# Patient Record
Sex: FEMALE | Race: WHITE | HISPANIC OR LATINO | Employment: UNEMPLOYED | URBAN - METROPOLITAN AREA
[De-identification: names, ages, dates, MRNs, and addresses within clinical notes are randomized per-mention and may not be internally consistent; named-entity substitution may affect disease eponyms.]

---

## 2022-06-29 ENCOUNTER — OFFICE VISIT (OUTPATIENT)
Dept: FAMILY MEDICINE CLINIC | Facility: CLINIC | Age: 22
End: 2022-06-29

## 2022-06-29 VITALS
SYSTOLIC BLOOD PRESSURE: 132 MMHG | HEIGHT: 66 IN | TEMPERATURE: 97.8 F | OXYGEN SATURATION: 98 % | HEART RATE: 78 BPM | BODY MASS INDEX: 42.59 KG/M2 | DIASTOLIC BLOOD PRESSURE: 76 MMHG | WEIGHT: 265 LBS

## 2022-06-29 DIAGNOSIS — Z12.4 ENCOUNTER FOR PAPANICOLAOU SMEAR OF CERVIX: ICD-10-CM

## 2022-06-29 DIAGNOSIS — Z00.00 WELL FEMALE EXAM WITHOUT GYNECOLOGICAL EXAM: Primary | ICD-10-CM

## 2022-06-29 PROCEDURE — 2014F MENTAL STATUS ASSESS: CPT | Performed by: NURSE PRACTITIONER

## 2022-06-29 PROCEDURE — 3008F BODY MASS INDEX DOCD: CPT | Performed by: NURSE PRACTITIONER

## 2022-06-29 PROCEDURE — 99385 PREV VISIT NEW AGE 18-39: CPT | Performed by: NURSE PRACTITIONER

## 2022-06-29 RX ORDER — CLINDAMYCIN HYDROCHLORIDE 300 MG/1
CAPSULE ORAL
COMMUNITY
Start: 2022-06-27 | End: 2022-09-15

## 2022-06-29 RX ORDER — IBUPROFEN 800 MG/1
TABLET ORAL
COMMUNITY
Start: 2022-06-27 | End: 2022-09-15

## 2022-06-29 NOTE — PROGRESS NOTES
Subjective   Celina Zayas is a 22 y.o. female.     Chief Complaint   Patient presents with   • Annual Exam     Declines papsmear     This is my first time seeing this patient. History obtained from patient and .   History of Present Illness   The patient is being seen for a health maintenance evaluation.  The last health maintenance visit is unknown.  Social history: Household members include spouse and in-laws.  She is .  Work status: Not currently employed.  The patient has never smoked cigarettes.  She reports no alcohol use.  General health: The patient's health is described as good.  She has regular dental visits.  The patient brushes 2 times a day, flosses occasionally and reports her last dental visit was less than 6 months ago.  She denies vision problems.  Vision care includes no need for vision correction and no recent eye exam.  She denies hearing loss.  Immunization status: Up-to-date.  Lifestyle: She does not exercise regularly.  Reproductive health: She is sexually active.  Screening: Pap smear is due.    The following portions of the patient's history were reviewed and updated as appropriate: allergies, current medications, past family history, past medical history, past social history, past surgical history and problem list.    History reviewed. No pertinent past medical history.    History reviewed. No pertinent surgical history.    History reviewed. No pertinent family history.    Social History     Socioeconomic History   • Marital status:    Tobacco Use   • Smoking status: Never Smoker   • Smokeless tobacco: Never Used   Substance and Sexual Activity   • Alcohol use: Never   • Drug use: Never       Review of Systems   Constitutional: Negative for fever.   HENT: Negative for ear pain, rhinorrhea and sore throat.    Eyes: Negative for visual disturbance.   Respiratory: Negative for cough and shortness of breath.    Cardiovascular: Negative for chest pain.  "  Gastrointestinal: Negative for abdominal pain, diarrhea, nausea and vomiting.   Genitourinary: Negative.    Musculoskeletal: Negative.    Skin: Negative for rash.   Neurological: Negative for dizziness and headache.   Psychiatric/Behavioral: Negative for depressed mood.       Objective   Vitals:    06/29/22 1139   BP: 132/76   BP Location: Right arm   Patient Position: Sitting   Cuff Size: Large Adult   Pulse: 78   Temp: 97.8 °F (36.6 °C)   TempSrc: Temporal   SpO2: 98%   Weight: 120 kg (265 lb)   Height: 167 cm (65.75\")      Body mass index is 43.1 kg/m².  Physical Exam  Vitals and nursing note reviewed.   Constitutional:       Appearance: Normal appearance.   HENT:      Head: Normocephalic and atraumatic.      Right Ear: Tympanic membrane and ear canal normal.      Left Ear: Tympanic membrane and ear canal normal.   Eyes:      Conjunctiva/sclera: Conjunctivae normal.      Pupils: Pupils are equal, round, and reactive to light.   Cardiovascular:      Rate and Rhythm: Normal rate and regular rhythm.      Heart sounds: Normal heart sounds.   Pulmonary:      Effort: Pulmonary effort is normal.      Breath sounds: Normal breath sounds.   Abdominal:      General: Bowel sounds are normal.      Palpations: Abdomen is soft.      Tenderness: There is no abdominal tenderness.   Genitourinary:     Comments: Declined   Musculoskeletal:         General: Normal range of motion.      Cervical back: Neck supple.   Skin:     General: Skin is warm and dry.   Neurological:      Mental Status: She is alert and oriented to person, place, and time.   Psychiatric:         Mood and Affect: Mood normal.           Assessment & Plan   Diagnoses and all orders for this visit:    1. Well female exam without gynecological exam (Primary)    2. Encounter for Papanicolaou smear of cervix  -     Ambulatory Referral to Gynecology    Impression: Currently, she has an inadequate exercise regimen.  Will refer to gynecology for Pap smear.  No " screening lab work is due at this time.  The immunizations are up-to-date.  Advice and education were given regarding aerobic exercise.

## 2022-09-15 ENCOUNTER — OFFICE VISIT (OUTPATIENT)
Dept: OBSTETRICS AND GYNECOLOGY | Age: 22
End: 2022-09-15

## 2022-09-15 VITALS
WEIGHT: 275 LBS | SYSTOLIC BLOOD PRESSURE: 135 MMHG | BODY MASS INDEX: 45.82 KG/M2 | HEIGHT: 65 IN | DIASTOLIC BLOOD PRESSURE: 90 MMHG

## 2022-09-15 DIAGNOSIS — Z12.4 ENCOUNTER FOR PAPANICOLAOU SMEAR FOR CERVICAL CANCER SCREENING: ICD-10-CM

## 2022-09-15 DIAGNOSIS — N89.8 VAGINAL DISCHARGE: ICD-10-CM

## 2022-09-15 DIAGNOSIS — R63.5 WEIGHT GAIN: ICD-10-CM

## 2022-09-15 DIAGNOSIS — Z30.09 CONTRACEPTIVE EDUCATION: ICD-10-CM

## 2022-09-15 DIAGNOSIS — N92.6 IRREGULAR MENSES: ICD-10-CM

## 2022-09-15 DIAGNOSIS — Z11.3 SCREEN FOR STD (SEXUALLY TRANSMITTED DISEASE): ICD-10-CM

## 2022-09-15 DIAGNOSIS — Z01.419 WELL WOMAN EXAM WITH ROUTINE GYNECOLOGICAL EXAM: Primary | ICD-10-CM

## 2022-09-15 DIAGNOSIS — Z23 NEED FOR HPV VACCINE: ICD-10-CM

## 2022-09-15 DIAGNOSIS — Z11.8 SCREENING FOR CHLAMYDIAL DISEASE: ICD-10-CM

## 2022-09-15 PROCEDURE — 99385 PREV VISIT NEW AGE 18-39: CPT | Performed by: PHYSICIAN ASSISTANT

## 2022-09-15 PROCEDURE — 90471 IMMUNIZATION ADMIN: CPT | Performed by: PHYSICIAN ASSISTANT

## 2022-09-15 PROCEDURE — 3008F BODY MASS INDEX DOCD: CPT | Performed by: PHYSICIAN ASSISTANT

## 2022-09-15 PROCEDURE — 90651 9VHPV VACCINE 2/3 DOSE IM: CPT | Performed by: PHYSICIAN ASSISTANT

## 2022-09-15 RX ORDER — FLUCONAZOLE 150 MG/1
150 TABLET ORAL ONCE
Qty: 2 TABLET | Refills: 0 | Status: SHIPPED | OUTPATIENT
Start: 2022-09-15 | End: 2022-09-15

## 2022-09-15 RX ORDER — NORETHINDRONE ACETATE AND ETHINYL ESTRADIOL 1MG-20(21)
1 KIT ORAL DAILY
Qty: 28 TABLET | Refills: 12 | Status: SHIPPED | OUTPATIENT
Start: 2022-09-15 | End: 2023-09-15

## 2022-09-15 NOTE — PROGRESS NOTES
Subjective     Chief Complaint   Patient presents with   • Gynecologic Exam     Cc; new pt today here for annual visit and pap - never had pap smear before - periods are very irregular past 2 months would like to discuss bcp or some sort of contraception - ISI garg here for the appointment - couple don't use condoms - moved from [place] 3 months ago -       History of Present Illness    Celina Zayas is a 22 y.o.  who presents for annual exam.    She is here with her BF, Claudio    Is noting vaginal discharge and itching  Onset prior to and after period   Has used monistat but caused caused more itching  Used it for 7 days  Onset 3 months ago    BF also noted a sore on top of his penis  No h/o this  He has been tested and it was negative    Never had a pap  Also needs a birth control    Says period are actually pretty regular, 28-32 days usually lasts for 7 days   Onset of menses at 11 yoa    From [place]  She is [place] speaking, BF is interpreting    Has not received gardasil in past, is open to receiving it    Her menses are irregular, lasting 4-7 days, dysmenorrhea none   Obstetric History:  OB History        1    Para        Term                AB   1    Living           SAB   1    IAB        Ectopic        Molar        Multiple        Live Births                   Menstrual History:     Patient's last menstrual period was 2022 (exact date).         Current contraception: none  History of abnormal Pap smear: never had before  Received Gardasil immunization: no  Perform regular self breast exam: no  Family history of uterine or ovarian cancer: no  Family History of colon cancer: no  Family history of breast cancer: no    Mammogram: not indicated.  Colonoscopy: not indicated.  DEXA: not indicated.    Exercise: not active  Calcium/Vitamin D: adequate intake    The following portions of the patient's history were reviewed and updated as appropriate: allergies, current  "medications, past family history, past medical history, past social history, past surgical history and problem list.    Review of Systems   Constitutional: Positive for unexpected weight change.   Genitourinary: Positive for menstrual problem (irregular periods).   All other systems reviewed and are negative.      Review of Systems   Constitutional: Negative for fatigue.   Respiratory: Negative for shortness of breath.    Gastrointestinal: Negative for abdominal pain.   Genitourinary: Negative for dysuria.   Neurological: Negative for headaches.   Psychiatric/Behavioral: Negative for dysphoric mood.         Objective   Physical Exam    /90   Ht 165.1 cm (65\")   Wt 125 kg (275 lb)   LMP 09/06/2022 (Exact Date)   Breastfeeding No   BMI 45.76 kg/m²   General:   alert, comfortable and no distress   Heart: regular rate and rhythm   Lungs: clear to auscultation bilaterally   Breast: normal appearance, no masses or tenderness, Inspection negative, No nipple retraction or dimpling, No nipple discharge or bleeding, No axillary or supraclavicular adenopathy, Normal to palpation without dominant masses, Taught monthly breast self examination   Neck: no adenopathy and no carotid bruit   Abdomen: normal findings: soft, non-tender   CVA: Not performed today   Pelvis: External genitalia: normal general appearance  Vaginal: normal mucosa without prolapse or lesions and discharge, white and thick  Cervix: normal appearance, thin prep PAP obtained and GC prep obtained  Adnexa: normal bimanual exam  Uterus: normal single, nontender  Exam limited by body habitus   Extremities: Not performed today   Neurologic: negative   Psychiatric: Normal affect, judgement, and mood     Assessment & Plan   Diagnoses and all orders for this visit:    1. Well woman exam with routine gynecological exam (Primary)  -     HPV 9-Valent Recomb Vaccine suspension 0.5 mL    2. Irregular menses  -     HPV 9-Valent Recomb Vaccine suspension 0.5 " mL    3. Screening for chlamydial disease  -     HPV 9-Valent Recomb Vaccine suspension 0.5 mL    4. Weight gain  -     TSH  -     Hemoglobin A1c  -     HPV 9-Valent Recomb Vaccine suspension 0.5 mL    5. Need for HPV vaccine  -     HPV 9-Valent Recomb Vaccine suspension 0.5 mL    6. Contraceptive education    7. Screen for STD (sexually transmitted disease)  -     IGP, CtNg, Rfx Aptima HPV ASCU    8. Encounter for Papanicolaou smear for cervical cancer screening  -     IGP, CtNg, Rfx Aptima HPV ASCU    9. Vaginal discharge  -     NuSwab BV & Candida - , Cervix    Other orders  -     fluconazole (Diflucan) 150 MG tablet; Take 1 tablet by mouth 1 (One) Time for 1 dose.  Dispense: 2 tablet; Refill: 0  -     norethindrone-ethinyl estradiol FE (Microgestin FE 1/20) 1-20 MG-MCG per tablet; Take 1 tablet by mouth Daily.  Dispense: 28 tablet; Refill: 12        All questions answered.  Breast self exam technique reviewed and patient encouraged to perform self-exam monthly.  Discussed healthy lifestyle modifications.  Recommended 30 minutes of aerobic exercise five times per week.  Discussed calcium needs to prevent osteoporosis.    Pap and std testing done  Suspect yeast, will start meds  Start bcp, start first day of next cycle, gave sample of phexxi to use in meantime  gardasil given today, rtc in 2 months for second dose  Check labs to assess weight gain, enc diet changes in meantime    Birth control risks were discussed with patient including risks of blood clots, strokes and DVT's. Also discussed proper use, start pill on first day of menses.  Use for one full month prior to having unprotected intercourse. BCP do not prevent against STD's, therefore condoms should be used at all times. Pt can expect irregular bleeding the first three months of use.

## 2022-09-16 LAB
HBA1C MFR BLD: 5.2 % (ref 4.8–5.6)
TSH SERPL DL<=0.005 MIU/L-ACNC: 1.54 UIU/ML (ref 0.27–4.2)

## 2022-09-18 LAB
A VAGINAE DNA VAG QL NAA+PROBE: ABNORMAL SCORE
BVAB2 DNA VAG QL NAA+PROBE: ABNORMAL SCORE
C ALBICANS DNA VAG QL NAA+PROBE: POSITIVE
C GLABRATA DNA VAG QL NAA+PROBE: NEGATIVE
MEGA1 DNA VAG QL NAA+PROBE: ABNORMAL SCORE

## 2022-09-19 ENCOUNTER — TELEPHONE (OUTPATIENT)
Dept: OBSTETRICS AND GYNECOLOGY | Age: 22
End: 2022-09-19

## 2022-09-19 NOTE — TELEPHONE ENCOUNTER
Voice mailbox not set up.  Unable to leave message for results:  swab is showing + for yeast, as suspected. I did give her medication at her appt, hope her sx's are improving.

## 2022-09-21 LAB
C TRACH RRNA CVX QL NAA+PROBE: NEGATIVE
CONV .: NORMAL
CYTOLOGIST CVX/VAG CYTO: NORMAL
CYTOLOGY CVX/VAG DOC CYTO: NORMAL
CYTOLOGY CVX/VAG DOC THIN PREP: NORMAL
DX ICD CODE: NORMAL
HIV 1 & 2 AB SER-IMP: NORMAL
N GONORRHOEA RRNA CVX QL NAA+PROBE: NEGATIVE
OTHER STN SPEC: NORMAL
STAT OF ADQ CVX/VAG CYTO-IMP: NORMAL

## 2022-09-29 ENCOUNTER — TELEPHONE (OUTPATIENT)
Dept: OBSTETRICS AND GYNECOLOGY | Age: 22
End: 2022-09-29

## 2022-09-29 RX ORDER — FLUCONAZOLE 150 MG/1
150 TABLET ORAL ONCE
Qty: 2 TABLET | Refills: 0 | Status: SHIPPED | OUTPATIENT
Start: 2022-09-29 | End: 2022-09-29

## 2022-09-29 RX ORDER — CLOTRIMAZOLE AND BETAMETHASONE DIPROPIONATE 10; .64 MG/G; MG/G
CREAM TOPICAL EVERY 12 HOURS SCHEDULED
Qty: 15 G | Refills: 0 | Status: SHIPPED | OUTPATIENT
Start: 2022-09-29 | End: 2023-02-07 | Stop reason: SDUPTHER

## 2022-09-29 NOTE — TELEPHONE ENCOUNTER
Pt's  calling stating that Pt finished medication for vaginal itching but the itching is not better. Pt is requesting more medication

## 2023-02-07 ENCOUNTER — OFFICE VISIT (OUTPATIENT)
Dept: OBSTETRICS AND GYNECOLOGY | Age: 23
End: 2023-02-07
Payer: COMMERCIAL

## 2023-02-07 VITALS
WEIGHT: 286 LBS | DIASTOLIC BLOOD PRESSURE: 72 MMHG | HEIGHT: 65 IN | SYSTOLIC BLOOD PRESSURE: 118 MMHG | BODY MASS INDEX: 47.65 KG/M2

## 2023-02-07 DIAGNOSIS — N92.6 IRREGULAR MENSES: ICD-10-CM

## 2023-02-07 DIAGNOSIS — N89.8 VAGINAL ITCHING: Primary | ICD-10-CM

## 2023-02-07 LAB
B-HCG UR QL: NEGATIVE
EXPIRATION DATE: NORMAL
INTERNAL NEGATIVE CONTROL: NORMAL
INTERNAL POSITIVE CONTROL: NORMAL
Lab: NORMAL

## 2023-02-07 PROCEDURE — 81025 URINE PREGNANCY TEST: CPT | Performed by: PHYSICIAN ASSISTANT

## 2023-02-07 PROCEDURE — 99213 OFFICE O/P EST LOW 20 MIN: CPT | Performed by: PHYSICIAN ASSISTANT

## 2023-02-07 RX ORDER — CLOTRIMAZOLE AND BETAMETHASONE DIPROPIONATE 10; .64 MG/G; MG/G
CREAM TOPICAL EVERY 12 HOURS SCHEDULED
Qty: 15 G | Refills: 2 | Status: SHIPPED | OUTPATIENT
Start: 2023-02-07

## 2023-02-07 RX ORDER — FLUCONAZOLE 150 MG/1
150 TABLET ORAL ONCE
Qty: 1 TABLET | Refills: 3 | Status: SHIPPED | OUTPATIENT
Start: 2023-02-07 | End: 2023-02-07

## 2023-02-07 NOTE — PROGRESS NOTES
"Subjective     Chief Complaint   Patient presents with   • Gynecologic Exam     c/o was seen before for vaginal itching, was given cream and pills for this and it has helped but she still is having the itching after her cycle.        Celina Zayas is a 22 y.o.  whose LMP is No LMP recorded. presents with vaginal itching    Here with c/o vaginal itching  Is recurrent, usually after menses  Does go away after a few days    Here with Claudio GARCIA  He interprets    Denies risk of sti    Did have 2 periods in January  Taking bcp and is good about taking it  Will do upt today      No Additional Complaints Reported    The following portions of the patient's history were reviewed and updated as appropriate:vital signs, allergies, current medications, past family history, past medical history, past social history, past surgical history and problem list      Review of Systems   Genitourinary:positive for vaginal discharge and itching and irregular menses in January     Objective      /72   Ht 165.1 cm (65\")   Wt 130 kg (286 lb)   BMI 47.59 kg/m²     Physical Exam    General:   alert, comfortable and no distress   Heart: Not performed today   Lungs: Not performed today.   Breast: Not performed today   Neck: Not performed today   Abdomen: Not performed today   CVA: Not performed today   Pelvis: Not performed today   Extremities: Not performed today   Neurologic: negative   Psychiatric: Normal affect, judgement, and mood       Lab Review   Labs: Urine pregnancy test    Imaging   No data reviewed    Assessment & Plan     ASSESSMENT  1. Vaginal itching    2. Irregular menses          PLAN  1.   Orders Placed This Encounter   Procedures   • POC Pregnancy, Urine       2. Medications prescribed this encounter:        New Medications Ordered This Visit   Medications   • fluconazole (Diflucan) 150 MG tablet     Sig: Take 1 tablet by mouth 1 (One) Time for 1 dose.     Dispense:  1 tablet     Refill:  3   • " clotrimazole-betamethasone (Lotrisone) 1-0.05 % cream     Sig: Apply  topically to the appropriate area as directed Every 12 (Twelve) Hours.     Dispense:  15 g     Refill:  2       3. Suspect yeast. Pt declined exam. Low risk for sti. Will start diflucan. Sent in refill to use prn but would encourage a topical cream first. I have previously given them info on ways to prevent yeast infections.       Follow up: 7 month(s)    MANUEL Zheng  2/7/2023